# Patient Record
Sex: MALE | Race: WHITE
[De-identification: names, ages, dates, MRNs, and addresses within clinical notes are randomized per-mention and may not be internally consistent; named-entity substitution may affect disease eponyms.]

---

## 2019-03-01 ENCOUNTER — HOSPITAL ENCOUNTER (EMERGENCY)
Dept: HOSPITAL 80 - FED | Age: 49
Discharge: HOME | End: 2019-03-01
Payer: COMMERCIAL

## 2019-03-01 VITALS — DIASTOLIC BLOOD PRESSURE: 83 MMHG | SYSTOLIC BLOOD PRESSURE: 145 MMHG

## 2019-03-01 DIAGNOSIS — N20.1: Primary | ICD-10-CM

## 2019-03-01 DIAGNOSIS — R10.9: ICD-10-CM

## 2019-03-01 LAB — PLATELET # BLD: 282 10^3/UL (ref 150–400)

## 2019-03-01 NOTE — EDPHY
H & P


Time Seen by Provider: 03/01/19 12:01


HPI/ROS: 





CHIEF COMPLAINT:  Severe left flank pain





HISTORY OF PRESENT ILLNESS:  Was driving to work, works as a mailman, developed 

severe left flank pain which radiates down to his left groin.  Not better worse 

with position, symptoms are severe.  No recent injury or trauma, no abdominal 

pain in front, no fever or chills or urinary symptoms.





REVIEW OF SYSTEMS:


Eye: no change in vision


ENT: no sore throat


Cardiac: no chest pain or syncope


Pulmonary: no cough or SOB


Abdomen: no vomiting, diarrhea, abdominal pain


Musculoskeletal:  HPI


Skin: no rash


Neuro: no headache


Constitutional: no fever


: no urinary symptoms





A comprehensive 10 point review of systems is otherwise negative aside from 

elements mentioned in the history of present illness.





PAST MEDICAL HISTORY:  Hernia surgery





Social history:  Works as a mailman





General Appearance: Alert and conversant, cooperative.  Very restless and 

uncomfortable.


Eyes: No scleral  icterus. 


ENT, Mouth: Normal mucous membranes.


Respiratory: Normal respiratory effort, breath sounds equal, lungs are clear to 

auscultation.


Cardiovascular:  Regular rate and rhythm.


Gastrointestinal:  Abdomen is soft and non tender.  No pulsatile mass.  Normal 

male .


Neurological: Alert, face symmetric, normal motor and sensory in extremities. 


Skin: Warm and dry, no rashes.


Musculoskeletal: No peripheral edema.


Psychiatric: Not agitated.





Emergency Department course/MDM:





Dilaudid 2 mg IV.  Arrives by EMS and was comfortable after fentanyl but then 

have recurrent pain.


CT abdomen pelvis discussed and consented to evaluate for renal colic.





1304:  Results discussed with the patient, feels more comfortable but is 

sleepy.  IV Toradol.  


1400:  Comfortable, plan to discharge when he is able to be weaned off his 

oxygen.


Smoking Status: Heavy smoker


Constitutional: 


 Initial Vital Signs











Temperature (C)  36.7 C   03/01/19 11:35


 


Heart Rate  70   03/01/19 11:35


 


Respiratory Rate  18   03/01/19 11:35


 


Blood Pressure  139/88 H  03/01/19 11:35


 


O2 Sat (%)  96   03/01/19 11:35








 











O2 Delivery Mode               Nasal Cannula


 


O2 (L/minute)                  4














Allergies/Adverse Reactions: 


 





No Known Allergies Allergy (Unverified 03/01/19 11:42)


 








Home Medications: 














 Medication  Instructions  Recorded


 


oxyCODONE/APAP 5/325 [Percocet] 1 - 2 tab PO Q4-6PRN PRN #11 tab 03/01/19














Medical Decision Making





- Diagnostics


Imaging Results: 


 Imaging Impressions





Abdomen/Pelvis CT  03/01/19 12:05


Impression: 3 mm distal left ureteral calculus.


 


Results discussed with Dr. Orestes Hairston.


 


General information for patients regarding this examination can be found at 

[email or web address].


 


If you have questions or comments about this report, please contact me at [phone] (hospital) or [phone] (cell). 


 











Imaging: I viewed and interpreted images myself





- Data Points


Laboratory Results: 


 Laboratory Results





 03/01/19 11:40 





 03/01/19 11:40 





 











  03/01/19 03/01/19 03/01/19





  11:40 11:40 11:40


 


WBC      6.79 10^3/uL 10^3/uL





     (3.80-9.50) 


 


RBC      4.72 10^6/uL 10^6/uL





     (4.40-6.38) 


 


Hgb      16.4 g/dL g/dL





     (13.7-17.5) 


 


Hct      46.3 % %





     (40.0-51.0) 


 


MCV      98.1 fL fL





     (81.5-99.8) 


 


MCH      34.7 pg H pg





     (27.9-34.1) 


 


MCHC      35.4 g/dL g/dL





     (32.4-36.7) 


 


RDW      14.0 % %





     (11.5-15.2) 


 


Plt Count      282 10^3/uL 10^3/uL





     (150-400) 


 


MPV      9.1 fL fL





     (8.7-11.7) 


 


Neut % (Auto)      46.2 % %





     (39.3-74.2) 


 


Lymph % (Auto)      40.8 % %





     (15.0-45.0) 


 


Mono % (Auto)      10.2 % %





     (4.5-13.0) 


 


Eos % (Auto)      1.6 % %





     (0.6-7.6) 


 


Baso % (Auto)      0.9 % %





     (0.3-1.7) 


 


Nucleat RBC Rel Count      0.0 % %





     (0.0-0.2) 


 


Absolute Neuts (auto)      3.14 10^3/uL 10^3/uL





     (1.70-6.50) 


 


Absolute Lymphs (auto)      2.77 10^3/uL 10^3/uL





     (1.00-3.00) 


 


Absolute Monos (auto)      0.69 10^3/uL 10^3/uL





     (0.30-0.80) 


 


Absolute Eos (auto)      0.11 10^3/uL 10^3/uL





     (0.03-0.40) 


 


Absolute Basos (auto)      0.06 10^3/uL 10^3/uL





     (0.02-0.10) 


 


Absolute Nucleated RBC      0.00 10^3/uL 10^3/uL





     (0-0.01) 


 


Immature Gran %      0.3 % %





     (0.0-1.1) 


 


Immature Gran #      0.02 10^3/uL 10^3/uL





     (0.00-0.10) 


 


Sodium    139 mEq/L mEq/L  





    (135-145)  


 


Potassium    4.2 mEq/L mEq/L  





    (3.5-5.2)  


 


Chloride    105 mEq/L mEq/L  





    ()  


 


Carbon Dioxide    24 mEq/l mEq/l  





    (22-31)  


 


Anion Gap    10 mEq/L mEq/L  





    (6-14)  


 


BUN    19 mg/dL mg/dL  





    (7-23)  


 


Creatinine    1.0 mg/dL mg/dL  





    (0.7-1.3)  


 


Estimated GFR    > 60   





    


 


Glucose    94 mg/dL mg/dL  





    ()  


 


Calcium    9.9 mg/dL mg/dL  





    (8.5-10.4)  


 


Urine Color  YELLOW     





    


 


Urine Appearance  HAZY     





    


 


Urine pH  8.0  H     





   (5.0-7.5)   


 


Ur Specific Gravity  1.025     





   (1.002-1.030)   


 


Urine Protein  1+  H     





   (NEGATIVE)   


 


Urine Ketones  NEGATIVE     





   (NEGATIVE)   


 


Urine Blood  2+  H     





   (NEGATIVE)   


 


Urine Nitrate  NEGATIVE     





   (NEGATIVE)   


 


Urine Bilirubin  NEGATIVE     





   (NEGATIVE)   


 


Urine Urobilinogen  NEGATIVE EU EU    





   (0.2-1.0)   


 


Ur Leukocyte Esterase  NEGATIVE     





   (NEGATIVE)   


 


Urine RBC   /hpf H /hpf    





   (0-3)   


 


Urine WBC  1-3 /hpf /hpf    





   (0-3)   


 


Ur Epithelial Cells  NONE SEEN /lpf /lpf    





   (NONE-1+)   


 


Urine Mucus  TRACE /lpf /lpf    





   (NONE-1+)   


 


Urine Glucose  NEGATIVE     





   (NEGATIVE)   











Medications Given: 


 








Discontinued Medications





Hydromorphone HCl (Dilaudid)  2 mg IVP EDNOW ONE


   Stop: 03/01/19 12:06


   Last Admin: 03/01/19 12:21 Dose:  2 mg


Ketorolac Tromethamine (Toradol)  15 mg IVP EDNOW ONE


   Stop: 03/01/19 13:07


   Last Admin: 03/01/19 13:12 Dose:  15 mg


Ondansetron HCl (Zofran)  4 mg IVP EDNOW ONE


   Stop: 03/01/19 12:23


   Last Admin: 03/01/19 12:23 Dose:  4 mg








Departure





- Departure


Disposition: Home, Routine, Self-Care


Clinical Impression: 


 Renal colic on left side





Condition: Good


Instructions:  Renal Colic (ED)


Additional Instructions: 


Oral ibuprofen 600 mg every 8 hr as needed for pain for the next 3-5 days.


Referrals: 


Mario Garcia MD [Medical Doctor] - 3-4 days, if not improved


Stand Alone Forms:  Work Excuse


Prescriptions: 


oxyCODONE/APAP 5/325 [Percocet] 1 - 2 tab PO Q4-6PRN PRN #11 tab


 PRN Reason: Pain